# Patient Record
Sex: FEMALE | Race: ASIAN | NOT HISPANIC OR LATINO | URBAN - METROPOLITAN AREA
[De-identification: names, ages, dates, MRNs, and addresses within clinical notes are randomized per-mention and may not be internally consistent; named-entity substitution may affect disease eponyms.]

---

## 2018-03-25 ENCOUNTER — EMERGENCY (EMERGENCY)
Facility: HOSPITAL | Age: 63
LOS: 1 days | Discharge: HOME | End: 2018-03-25
Attending: EMERGENCY MEDICINE | Admitting: EMERGENCY MEDICINE
Payer: COMMERCIAL

## 2018-03-25 VITALS
RESPIRATION RATE: 16 BRPM | HEART RATE: 87 BPM | OXYGEN SATURATION: 100 % | TEMPERATURE: 99 F | DIASTOLIC BLOOD PRESSURE: 82 MMHG | SYSTOLIC BLOOD PRESSURE: 176 MMHG

## 2018-03-25 VITALS
HEART RATE: 72 BPM | TEMPERATURE: 99 F | DIASTOLIC BLOOD PRESSURE: 71 MMHG | RESPIRATION RATE: 18 BRPM | SYSTOLIC BLOOD PRESSURE: 167 MMHG | OXYGEN SATURATION: 100 %

## 2018-03-25 PROCEDURE — 72100 X-RAY EXAM L-S SPINE 2/3 VWS: CPT | Mod: 26

## 2018-03-25 PROCEDURE — 72074 X-RAY EXAM THORAC SPINE4/>VW: CPT | Mod: 26

## 2018-03-25 PROCEDURE — 99283 EMERGENCY DEPT VISIT LOW MDM: CPT

## 2018-03-25 RX ORDER — ACETAMINOPHEN 500 MG
650 TABLET ORAL ONCE
Qty: 0 | Refills: 0 | Status: COMPLETED | OUTPATIENT
Start: 2018-03-25 | End: 2018-03-25

## 2018-03-25 NOTE — ED PROVIDER NOTE - PROGRESS NOTE DETAILS
pt asking to go home. pt to see pmd in 1-2 days. copies of xr read given to pt. pt advised to have BP rechecked in one week.
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
## 2018-09-26 PROBLEM — Z00.00 ENCOUNTER FOR PREVENTIVE HEALTH EXAMINATION: Status: ACTIVE | Noted: 2018-09-26

## 2018-09-26 PROBLEM — I10 ESSENTIAL (PRIMARY) HYPERTENSION: Chronic | Status: ACTIVE | Noted: 2018-03-25

## 2018-10-01 ENCOUNTER — APPOINTMENT (OUTPATIENT)
Dept: GYNECOLOGIC ONCOLOGY | Facility: CLINIC | Age: 63
End: 2018-10-01
Payer: COMMERCIAL

## 2018-10-01 VITALS
SYSTOLIC BLOOD PRESSURE: 148 MMHG | HEART RATE: 77 BPM | BODY MASS INDEX: 24.99 KG/M2 | DIASTOLIC BLOOD PRESSURE: 81 MMHG | HEIGHT: 65 IN | WEIGHT: 150 LBS

## 2018-10-01 PROCEDURE — 99244 OFF/OP CNSLTJ NEW/EST MOD 40: CPT

## 2018-10-01 RX ORDER — METFORMIN HYDROCHLORIDE 625 MG/1
TABLET ORAL
Refills: 0 | Status: ACTIVE | COMMUNITY

## 2018-10-01 RX ORDER — LOSARTAN POTASSIUM 100 MG/1
TABLET, FILM COATED ORAL
Refills: 0 | Status: ACTIVE | COMMUNITY

## 2018-11-29 ENCOUNTER — OUTPATIENT (OUTPATIENT)
Dept: OUTPATIENT SERVICES | Facility: HOSPITAL | Age: 63
LOS: 1 days | End: 2018-11-29
Payer: COMMERCIAL

## 2018-11-29 VITALS
OXYGEN SATURATION: 99 % | WEIGHT: 149.91 LBS | HEART RATE: 6 BPM | RESPIRATION RATE: 12 BRPM | SYSTOLIC BLOOD PRESSURE: 140 MMHG | HEIGHT: 63 IN | TEMPERATURE: 99 F | DIASTOLIC BLOOD PRESSURE: 84 MMHG

## 2018-11-29 DIAGNOSIS — E11.9 TYPE 2 DIABETES MELLITUS WITHOUT COMPLICATIONS: ICD-10-CM

## 2018-11-29 DIAGNOSIS — I10 ESSENTIAL (PRIMARY) HYPERTENSION: ICD-10-CM

## 2018-11-29 DIAGNOSIS — D25.9 LEIOMYOMA OF UTERUS, UNSPECIFIED: ICD-10-CM

## 2018-11-29 LAB
BLD GP AB SCN SERPL QL: NEGATIVE — SIGNIFICANT CHANGE UP
HBA1C BLD-MCNC: 6.6 % — HIGH (ref 4–5.6)
RH IG SCN BLD-IMP: POSITIVE — SIGNIFICANT CHANGE UP

## 2018-11-29 PROCEDURE — 93010 ELECTROCARDIOGRAM REPORT: CPT

## 2018-11-29 RX ORDER — SODIUM CHLORIDE 9 MG/ML
1000 INJECTION, SOLUTION INTRAVENOUS
Qty: 0 | Refills: 0 | Status: DISCONTINUED | OUTPATIENT
Start: 2018-12-11 | End: 2018-12-11

## 2018-11-29 NOTE — H&P PST ADULT - HISTORY OF PRESENT ILLNESS
This is a 62 y.o. female with LMP age 56  with history of leiomyoma , sono last 3 years ago . Pt was evaluated by GYN  , sone done . Pt has leiomyoma of uterus ,unspecified . Pt referred to Dr Correa, now for surgery .

## 2018-11-29 NOTE — H&P PST ADULT - NSANTHOSAYNRD_GEN_A_CORE
No. ARIELLA screening performed.  STOP BANG Legend: 0-2 = LOW Risk; 3-4 = INTERMEDIATE Risk; 5-8 = HIGH Risk

## 2018-11-29 NOTE — H&P PST ADULT - PROBLEM SELECTOR PLAN 3
pt to hold metformin with 24 hours of surgery , stat FS  upon admit ,monitor FS during hospital stay . Pt is npo from 11 pm the night before surgery .

## 2018-11-29 NOTE — H&P PST ADULT - NS PRO LAST MENSTRUAL DATE
LMP age 56 no back pain, no gout, no musculoskeletal pain, no neck pain, and no weakness. no back pain, no musculoskeletal pain, no neck pain, and no weakness.

## 2018-11-29 NOTE — H&P PST ADULT - TEACHING/LEARNING LEARNING PREFERENCES
individual instruction/written material/pictorial/skill demonstration/computer/internet/verbal instruction

## 2018-11-29 NOTE — H&P PST ADULT - LYMPHATIC
posterior cervical L/supraclavicular R/anterior cervical L/anterior cervical R/supraclavicular L/posterior cervical R

## 2018-11-29 NOTE — H&P PST ADULT - RS GEN PE MLT RESP DETAILS PC
clear to auscultation bilaterally/no rhonchi/no rales/good air movement/breath sounds equal/no wheezes/respirations non-labored

## 2018-12-10 ENCOUNTER — TRANSCRIPTION ENCOUNTER (OUTPATIENT)
Age: 63
End: 2018-12-10

## 2018-12-11 ENCOUNTER — RESULT REVIEW (OUTPATIENT)
Age: 63
End: 2018-12-11

## 2018-12-11 ENCOUNTER — OUTPATIENT (OUTPATIENT)
Dept: OUTPATIENT SERVICES | Facility: HOSPITAL | Age: 63
LOS: 1 days | Discharge: HOME | End: 2018-12-11
Payer: COMMERCIAL

## 2018-12-11 ENCOUNTER — APPOINTMENT (OUTPATIENT)
Dept: GYNECOLOGIC ONCOLOGY | Facility: HOSPITAL | Age: 63
End: 2018-12-11

## 2018-12-11 VITALS
DIASTOLIC BLOOD PRESSURE: 77 MMHG | RESPIRATION RATE: 15 BRPM | TEMPERATURE: 99 F | HEART RATE: 75 BPM | SYSTOLIC BLOOD PRESSURE: 150 MMHG | OXYGEN SATURATION: 100 %

## 2018-12-11 VITALS
TEMPERATURE: 98 F | RESPIRATION RATE: 16 BRPM | HEART RATE: 70 BPM | SYSTOLIC BLOOD PRESSURE: 165 MMHG | OXYGEN SATURATION: 100 % | HEIGHT: 65 IN | WEIGHT: 145.06 LBS | DIASTOLIC BLOOD PRESSURE: 77 MMHG

## 2018-12-11 DIAGNOSIS — D25.9 LEIOMYOMA OF UTERUS, UNSPECIFIED: ICD-10-CM

## 2018-12-11 LAB
GLUCOSE BLDC GLUCOMTR-MCNC: 144 MG/DL — HIGH (ref 70–99)
RH IG SCN BLD-IMP: POSITIVE — SIGNIFICANT CHANGE UP

## 2018-12-11 PROCEDURE — S2900 ROBOTIC SURGICAL SYSTEM: CPT | Mod: NC

## 2018-12-11 PROCEDURE — 88307 TISSUE EXAM BY PATHOLOGIST: CPT | Mod: 26

## 2018-12-11 PROCEDURE — 88331 PATH CONSLTJ SURG 1 BLK 1SPC: CPT | Mod: 26

## 2018-12-11 PROCEDURE — 88112 CYTOPATH CELL ENHANCE TECH: CPT | Mod: 26

## 2018-12-11 PROCEDURE — 58572 TLH UTERUS OVER 250 G: CPT | Mod: GC

## 2018-12-11 RX ORDER — SODIUM CHLORIDE 9 MG/ML
1000 INJECTION, SOLUTION INTRAVENOUS
Qty: 0 | Refills: 0 | Status: DISCONTINUED | OUTPATIENT
Start: 2018-12-11 | End: 2018-12-26

## 2018-12-11 RX ORDER — DEXTROSE 50 % IN WATER 50 %
25 SYRINGE (ML) INTRAVENOUS ONCE
Qty: 0 | Refills: 0 | Status: DISCONTINUED | OUTPATIENT
Start: 2018-12-11 | End: 2018-12-26

## 2018-12-11 RX ORDER — INSULIN LISPRO 100/ML
VIAL (ML) SUBCUTANEOUS AT BEDTIME
Qty: 0 | Refills: 0 | Status: DISCONTINUED | OUTPATIENT
Start: 2018-12-11 | End: 2018-12-26

## 2018-12-11 RX ORDER — DEXTROSE 50 % IN WATER 50 %
12.5 SYRINGE (ML) INTRAVENOUS ONCE
Qty: 0 | Refills: 0 | Status: DISCONTINUED | OUTPATIENT
Start: 2018-12-11 | End: 2018-12-26

## 2018-12-11 RX ORDER — HYDROMORPHONE HYDROCHLORIDE 2 MG/ML
0.5 INJECTION INTRAMUSCULAR; INTRAVENOUS; SUBCUTANEOUS
Qty: 0 | Refills: 0 | Status: DISCONTINUED | OUTPATIENT
Start: 2018-12-11 | End: 2018-12-12

## 2018-12-11 RX ORDER — DEXTROSE 50 % IN WATER 50 %
15 SYRINGE (ML) INTRAVENOUS ONCE
Qty: 0 | Refills: 0 | Status: DISCONTINUED | OUTPATIENT
Start: 2018-12-11 | End: 2018-12-26

## 2018-12-11 RX ORDER — FENTANYL CITRATE 50 UG/ML
50 INJECTION INTRAVENOUS
Qty: 0 | Refills: 0 | Status: DISCONTINUED | OUTPATIENT
Start: 2018-12-11 | End: 2018-12-12

## 2018-12-11 RX ORDER — HEPARIN SODIUM 5000 [USP'U]/ML
5000 INJECTION INTRAVENOUS; SUBCUTANEOUS ONCE
Qty: 0 | Refills: 0 | Status: COMPLETED | OUTPATIENT
Start: 2018-12-11 | End: 2018-12-11

## 2018-12-11 RX ORDER — ONDANSETRON 8 MG/1
4 TABLET, FILM COATED ORAL ONCE
Qty: 0 | Refills: 0 | Status: DISCONTINUED | OUTPATIENT
Start: 2018-12-11 | End: 2018-12-12

## 2018-12-11 RX ORDER — LOSARTAN POTASSIUM 100 MG/1
1 TABLET, FILM COATED ORAL
Qty: 0 | Refills: 0 | COMMUNITY

## 2018-12-11 RX ORDER — ACETAMINOPHEN 500 MG
975 TABLET ORAL ONCE
Qty: 0 | Refills: 0 | Status: COMPLETED | OUTPATIENT
Start: 2018-12-11 | End: 2018-12-11

## 2018-12-11 RX ORDER — METFORMIN HYDROCHLORIDE 850 MG/1
1 TABLET ORAL
Qty: 0 | Refills: 0 | COMMUNITY

## 2018-12-11 RX ORDER — INSULIN LISPRO 100/ML
VIAL (ML) SUBCUTANEOUS
Qty: 0 | Refills: 0 | Status: DISCONTINUED | OUTPATIENT
Start: 2018-12-11 | End: 2018-12-26

## 2018-12-11 RX ORDER — GLUCAGON INJECTION, SOLUTION 0.5 MG/.1ML
1 INJECTION, SOLUTION SUBCUTANEOUS ONCE
Qty: 0 | Refills: 0 | Status: DISCONTINUED | OUTPATIENT
Start: 2018-12-11 | End: 2018-12-26

## 2018-12-11 RX ADMIN — HEPARIN SODIUM 5000 UNIT(S): 5000 INJECTION INTRAVENOUS; SUBCUTANEOUS at 06:46

## 2018-12-11 RX ADMIN — Medication 975 MILLIGRAM(S): at 20:50

## 2018-12-11 RX ADMIN — Medication 975 MILLIGRAM(S): at 20:20

## 2018-12-11 RX ADMIN — SODIUM CHLORIDE 30 MILLILITER(S): 9 INJECTION, SOLUTION INTRAVENOUS at 06:47

## 2018-12-11 NOTE — ASU DISCHARGE PLAN (ADULT/PEDIATRIC). - ACTIVITY LEVEL
no tub baths/nothing per vagina/no tampons/no intercourse/no douching nothing per vagina/no intercourse/no heavy lifting/no tampons/no sports/gym/no tub baths/no douching

## 2018-12-11 NOTE — CHART NOTE - NSCHARTNOTEFT_GEN_A_CORE
PA POST OP NOTE:       SUBJECTIVE:  Patient seen and examined at bedside. Patient is awake and resting comfortably. Reports discomfort from arshad and mild nausea at this time. Denies c/o  vomiting, sob, cp or palpitations.    Vital Signs Last 24 Hrs  T(C): 36.9 (11 Dec 2018 13:00), Max: 36.9 (11 Dec 2018 06:17)  T(F): 98.4 (11 Dec 2018 13:00), Max: 98.4 (11 Dec 2018 06:17)  HR: 61 (11 Dec 2018 13:00) (51 - 70)  BP: 137/62 (11 Dec 2018 13:00) (129/61 - 165/77)  BP(mean): 82 (11 Dec 2018 13:00) (74 - 82)  RR: 17 (11 Dec 2018 13:00) (11 - 17)  SpO2: 96% (11 Dec 2018 13:00) (94% - 100%)      PHYSICAL EXAM:    LUNGS: Clear to auscultation bilaterally; No wheezing.  HEART: Regular, rate and rhythm; No murmurs.  ABDOMEN: Soft, decreased BS, nondistended and appropriately tender on palpation.  INCISION:  Scope sites intact with scant serosanguinous drainage on umbilical dressing.  EXTREMITIES: No swelling or calf tenderness bilaterally. Venodynes in place.  ARSHAD: Urine clear. Removed by me in PACU.    MEDICATIONS  (STANDING):  dextrose 5%. 1000 milliLiter(s) (50 mL/Hr) IV Continuous <Continuous>  dextrose 50% Injectable 12.5 Gram(s) IV Push once  dextrose 50% Injectable 25 Gram(s) IV Push once  dextrose 50% Injectable 25 Gram(s) IV Push once  insulin lispro (HumaLOG) corrective regimen sliding scale   SubCutaneous three times a day before meals  insulin lispro (HumaLOG) corrective regimen sliding scale   SubCutaneous at bedtime  lactated ringers. 1000 milliLiter(s) (30 mL/Hr) IV Continuous <Continuous>    MEDICATIONS  (PRN):  dextrose 40% Gel 15 Gram(s) Oral once PRN Blood Glucose LESS THAN 70 milliGRAM(s)/deciliter  fentaNYL    Injectable 50 MICROGram(s) IV Push every 10 minutes PRN Severe Pain (7 - 10)  glucagon  Injectable 1 milliGRAM(s) IntraMuscular once PRN Glucose LESS THAN 70 milligrams/deciliter  HYDROmorphone  Injectable 0.5 milliGRAM(s) IV Push every 10 minutes PRN Moderate Pain (4 - 6)  ondansetron Injectable 4 milliGRAM(s) IV Push once PRN Nausea and/or Vomiting      ASSESMENT/PLAN:  64 y/o POD#0 from Robotic TLH, BSO  in stable condition.    1. Regular diet as tolerate.  2. IVF: RL @125cc/hr.  3. Activity: Out of bed with assist.  4. Vital Signs Q routine.  5.  Pain meds and anti emetic as ordered.  6. DTV  7. Continue  Venodynes for DVT prophylaxis.    Evaluate for discharge home when tolerates diet, voids, ambulates and vss.

## 2018-12-11 NOTE — ASU DISCHARGE PLAN (ADULT/PEDIATRIC). - NURSING INSTRUCTIONS
Nothing in vagina, no intercourse, no douching, no tampons, no tub baths, and no swimming for 2 weeks or until cleared by M.D.   Please report any signs and symptoms of infection including Fever (Temp >101 or >100.4 if GYN procedure), uncontrollable nausea, vomiting, diarrhea, chills & inability to urinate. Shower with soap & water when appropriate, pat dry with clean towel & no ointments, creams, powders or lotions on incisions unless okayed by MD. Please report any puss or increased drainage from incision sites, or if redness develops and spreads around sites. Please practice good hand hygiene especially after using the bathroom. Follow up with all MD appointments and take medication(s) as prescribed   Do not take pain medication on an empty stomach.  Increase fluids and fiber in diet to prevent constipation.   No Tylenol/Acetaminophen products until 430 pm Nothing in vagina, no intercourse, no douching, no tampons, no tub baths, and no swimming for 2 weeks or until cleared by M.D.   Please report any signs and symptoms of infection including Fever (Temp >101 or >100.4 if GYN procedure), uncontrollable nausea, vomiting, diarrhea, chills & inability to urinate. Shower with soap & water when appropriate, pat dry with clean towel & no ointments, creams, powders or lotions on incisions unless okayed by MD. Please report any puss or increased drainage from incision sites, or if redness develops and spreads around sites. Please practice good hand hygiene especially after using the bathroom. Follow up with all MD appointments and take medication(s) as prescribed   Do not take pain medication on an empty stomach.  Increase fluids and fiber in diet to prevent constipation.   No Tylenol/Acetaminophen products until 430 pm  If at any time you notice that no urine has drained for more than one hour, make sure that you are drinking adequate liquids. If this persists despite increases in your liquid intake, call your urologist. Do not allow the catheter to restrict your activity. Moving about and walking are important.

## 2018-12-11 NOTE — ASU PREOP CHECKLIST - COMMENTS
took losartan this am with sip of water and famitodine took losartan this am with sip of water and famotidine

## 2018-12-11 NOTE — ASU DISCHARGE PLAN (ADULT/PEDIATRIC). - ITEMS TO FOLLOWUP WITH YOUR PHYSICIAN'S
FEVER GREATER THAT 101, EXCESSIVE BLEEDING, PAIN UNRELIEVED BY PAIN MEDS. 1. Home with Troy Catheter (unable to void) Leg bag given to patient. Teaching given to patient by RN.  2. Follow up with Dr. Correa in her office on Thursday 12/13.  Call the office tomorrow (12/13) to find out best time to go on Thursday  3. FEVER GREATER THAT 101, EXCESSIVE BLEEDING, PAIN UNRELIEVED BY PAIN MEDS.

## 2018-12-11 NOTE — ASU DISCHARGE PLAN (ADULT/PEDIATRIC). - MEDICATION SUMMARY - MEDICATIONS TO TAKE
I will START or STAY ON the medications listed below when I get home from the hospital:    naproxen 500 mg oral tablet  -- 1 tab(s) by mouth 2 times a day   -- Check with your doctor before becoming pregnant.  May cause drowsiness or dizziness.  Obtain medical advice before taking any non-prescription drugs as some may affect the action of this medication.  Take with food or milk.    -- Indication: For Pain    oxyCODONE-acetaminophen 5 mg-325 mg oral tablet  -- 1 tab(s) by mouth every 6 hours, As Needed -for severe pain MDD:4  -- Caution federal law prohibits the transfer of this drug to any person other  than the person for whom it was prescribed.  May cause drowsiness.  Alcohol may intensify this effect.  Use care when operating dangerous machinery.  This prescription cannot be refilled.  This product contains acetaminophen.  Do not use  with any other product containing acetaminophen to prevent possible liver damage.  Using more of this medication than prescribed may cause serious breathing problems.    -- Indication: For Severe pain    losartan 100 mg oral tablet  -- 1 tab(s) by mouth once a day,am  -- Indication: For Home med    metFORMIN 500 mg oral tablet  -- 1 tab(s) by mouth 2 times a day  -- Indication: For Home med

## 2018-12-11 NOTE — CHART NOTE - NSCHARTNOTEFT_GEN_A_CORE
PA Note  Pt was unable to void an adequate amount since removal of Troy Catheter at 130pm.  Pt did try multiple times and 10cc/40cc respectively she was able to void.   Pt was drinking fluids, her IV at 125cc running, and pt ate without difficulty.  I explained to pt some of the reasons why the bladder is unable to empty after surgery and the need for the Troy catheter to be reinserted and for her to come home with it.    RN gave the pt d/c instructions regarding the leg bag and the way to change between the larger bag and leg bag.  Dr. George called regarding above, Dr. Correa informed as well    Instructed to reinsert Troy.  11pm Troy catheter reinserted sterilely into the bladder by me and 700cc urine drained immediately.    Pt felt much better and understood all directions.    BERNIE Kelsey  #[id number]

## 2018-12-11 NOTE — BRIEF OPERATIVE NOTE - PROCEDURE
<<-----Click on this checkbox to enter Procedure Hysterectomy, robot-assisted, for uterus greater than 250 grams, laparoscopic, with salpingo-oophorectomy  12/11/2018    Active  [facility]

## 2018-12-11 NOTE — ASU DISCHARGE PLAN (ADULT/PEDIATRIC). - SPECIAL INSTRUCTIONS
Home with Troy Catheter for inability to void an adequate amount before discharge. Leg bag teaching given to patient and her family member.

## 2018-12-11 NOTE — BRIEF OPERATIVE NOTE - OPERATION/FINDINGS
10 week size uterus with 8cm posterior calcified fibroid, additional smaller fibroids, ovaries and fallopian tubes grossly unremarkable, upper abdomen unremarkable

## 2018-12-11 NOTE — ASU DISCHARGE PLAN (ADULT/PEDIATRIC). - NOTIFY
Persistent Nausea and Vomiting/GYN Fever>100.4/Pain not relieved by Medications/Bleeding that does not stop/Unable to Urinate GYN Fever>100.4/Excessive Diarrhea/Persistent Nausea and Vomiting/Unable to Urinate/Pain not relieved by Medications/Numbness, tingling/Increased Irritability or Sluggishness/Bleeding that does not stop/Inability to Tolerate Liquids or Foods GYN Fever>100.4/Numbness, tingling/Inability to Tolerate Liquids or Foods/Pain not relieved by Medications/Persistent Nausea and Vomiting/Increased Irritability or Sluggishness/Excessive Diarrhea/Bleeding that does not stop

## 2018-12-12 PROBLEM — E11.9 TYPE 2 DIABETES MELLITUS WITHOUT COMPLICATIONS: Chronic | Status: ACTIVE | Noted: 2018-11-29

## 2018-12-13 ENCOUNTER — APPOINTMENT (OUTPATIENT)
Dept: GYNECOLOGIC ONCOLOGY | Facility: CLINIC | Age: 63
End: 2018-12-13

## 2018-12-13 LAB — NON-GYNECOLOGICAL CYTOLOGY STUDY: SIGNIFICANT CHANGE UP

## 2018-12-20 LAB — SURGICAL PATHOLOGY STUDY: SIGNIFICANT CHANGE UP

## 2018-12-27 ENCOUNTER — APPOINTMENT (OUTPATIENT)
Dept: GYNECOLOGIC ONCOLOGY | Facility: CLINIC | Age: 63
End: 2018-12-27
Payer: COMMERCIAL

## 2018-12-27 VITALS
TEMPERATURE: 98.7 F | BODY MASS INDEX: 23.85 KG/M2 | HEIGHT: 65 IN | HEART RATE: 85 BPM | SYSTOLIC BLOOD PRESSURE: 116 MMHG | WEIGHT: 143.13 LBS | DIASTOLIC BLOOD PRESSURE: 78 MMHG

## 2018-12-27 PROCEDURE — 99024 POSTOP FOLLOW-UP VISIT: CPT

## 2018-12-31 ENCOUNTER — OTHER (OUTPATIENT)
Age: 63
End: 2018-12-31

## 2019-01-14 ENCOUNTER — APPOINTMENT (OUTPATIENT)
Dept: GYNECOLOGIC ONCOLOGY | Facility: CLINIC | Age: 64
End: 2019-01-14
Payer: COMMERCIAL

## 2019-01-14 VITALS
HEART RATE: 83 BPM | BODY MASS INDEX: 23.82 KG/M2 | SYSTOLIC BLOOD PRESSURE: 123 MMHG | HEIGHT: 65 IN | WEIGHT: 143 LBS | TEMPERATURE: 98.3 F | DIASTOLIC BLOOD PRESSURE: 82 MMHG

## 2019-01-14 DIAGNOSIS — D21.9 BENIGN NEOPLASM OF CONNECTIVE AND OTHER SOFT TISSUE, UNSPECIFIED: ICD-10-CM

## 2019-01-14 PROCEDURE — 99024 POSTOP FOLLOW-UP VISIT: CPT

## 2019-06-06 ENCOUNTER — RESULT REVIEW (OUTPATIENT)
Age: 64
End: 2019-06-06

## 2021-02-04 NOTE — H&P PST ADULT - BREASTS
Pt is seen, examined, chart reviewed, d/w np/pa.  Management as outlined above.  Chest Pain: Recent NST 07/2020 with no ischemia. If troponins are negative, can D/C home with outpatient ischemic evaluation in 2 weeks with Dr. Allen.
not examined

## 2021-02-22 NOTE — ASU PREOP CHECKLIST - ADVANCE DIRECTIVE ADDRESSED/READDRESSED
Coordination of Care  1. Have you been to the ER, urgent care clinic since your last visit? Hospitalized since your last visit? Yes [facility]: [facility] skin rash. [facility] 2020 covid 19    2. Have you seen or consulted any other health care providers outside of the [address] [place] [address] since your last visit? Include any pap smears or colon screening. Yes When: pt went to dermatologist can not recall name or date    Does the patient need refills? YES    Learning Assessment Complete?  yes  Depression Screening complete in the past 12 months? yes done

## 2021-04-16 NOTE — ASU PATIENT PROFILE, ADULT - AS SC BRADEN ACTIVITY
2021       Marie Lane MD  [address]  [facility] [address]  Via Fax: [phone]      Patient: Zahra Parson   YOB: 1951   Date of Visit: 2021       Dear Dr. Lane:    I saw your patient, Zahra Parson, for an evaluation. Below are my notes for this visit with her.    If you have questions, please do not hesitate to call me.      Sincerely,        Gary Mcdaniels MD        CC: No Recipients  Gary Mcdaniels MD  2021 11:18 AM  Signed    Patient: Zahra Parson Date: 2021   : 1951 Attending: Gary Mcdaniels MD   69 year old female      PRINCIPLE DIAGNOSIS:      1. IgG lambda smoldering multiple myeloma.    Cancer Staging Summary for Zahra Parson           REASON FOR VISIT:    Zahra Parson has a history of IgG lambda smoldering multiple myeloma which was initially diagnosed in 2013.  With a rise in her monoclonal paraprotein 2016 she went on to receive treatment with Revlimid and dexamethasone starting in 2016.  Treatment was complicated by her having poorly controlled diabetes along with her then having a saddle pulmonary embolism in 2017 and future treatment was discontinued.       Treatment was discontinued until there was a rise in her paraprotein level in late .  She subsequently went on to receive ixazomib given along with dexamethasone total of 1 year.  This was administered between between 2018 and 2019.       She had a favorable response to treatment with an improvement noted in the IgG lambda monoclonal paraprotein level.  And no point in time did she have end organ damage with lytic lesions, hypercalcemia or renal insufficiency.    2021    She returns for follow-up visit.      DETAILED ONCOLOGY HISTORY:    Zahra Parson has had a longstanding history of anemia for much of her life.  A bone marrow examination had been done in 1996, as she had a monoclonal  gammopathy.  There was only a very slight increase in the percentage of plasma cells at that time.  There was no evidence of multiple myeloma.  A single noncaseating granuloma was identified.  Starting in June 2012, she was noted to have mild asymptomatic leukopenia.  She became increasingly more anemic over time with normocytic and normochromic indices.  She was not having any symptoms associated with this.     Laboratory studies done in March 2013 revealed normal iron studies and a normal vitamin B12 and folate level.  There was no evidence of hemolytic anemia.  An immunofixation test revealed an IgG lambda monoclonal paraprotein.  The M-spike at that time was 0.4.  Peripheral blood for flow cytometry analysis showed no evidence of PNH or LGL disease.  A bone marrow biopsy and aspirate revealed variable populations of monoclonal plasma cells ranging 3% to 15%.  On average, she had just under 10% plasma cells noted of a monoclonal nature with IgG lambda clonality.  Chromosomes studies on the bone marrow were normal.  She has not had a full staging evaluation done as of yet.  She likely has an evolution towards a smoldering multiple myeloma condition at this time.     Given this information a bone marrow biopsy and aspirate was done (In mid October 2016).  The bone marrow biopsy and aspirate showed a mildly hypercellular bone marrow with 50% cellularity.  There was normal trilineage hematopoiesis.  Minimal dyserythropoiesis was noted with megakaryocytic atypia.  There was 20% monoclonal plasma cell infiltration consistent with multiple myeloma.  Patchy mild increased reticulin fibrosis was noted.  Adequate storage iron was seen. The bone marrow biopsy flow cytometry showed 20% monoclonal plasma cell infiltration compatible with a plasma cell neoplastic disorder.  The FISH analysis revealed evidence of a translocation (11; 14) with this representing a CCBD1/IgH translocation.  This is associated with a favorable  prognosis.     Additional staging studies were subsequently done.  She had a skeletal bone survey done which showed arthritis but no lytic lesions.  She had MRI imaging done of the calvarium, cervical, thoracic and lumbosacral spine.  No lytic lesions or soft tissue lesions were noted.  She had a PET/CT scan done which showed no areas of increased metabolic activity.  She had a 24-hour urine study which showed no increase in light chain excretion.     On December 5, 2016 she treatment with Revlimid at a dose of 25 mg daily on days 1 through 21 which will be administered every 28 days once medication is available.  With this she takes dexamethasone 20 mg daily on days 1 through 4 and again on days 12 through 15.  Treatment with this regimen will be given every 28 days for 9 cycles.  She would then start a new intense therapy with Revlimid at a dose of 10 mg daily for days 1 through 21 every 28 days for 2 years.  This is for management of her smoldering multiple myeloma.     She began the second cycle of therapy with Revlimid on January 9, 2017.   Revlimid was discontinued following her hospitalization at [facility] with a saddle pulmonary embolism which had extended into the segmental branches of the right and left lung.       She was admitted to the hospital after presenting with a 5 day history of anterior chest pain and shortness of breath.  A CT angiogram on January 27, 2017 revealed a several pulmonary embolism which had extended into segmental branches of the right and left lung with involvement and all lobes of the lung.  There was a nonocclusive chronic-appearing DVT seen in the right popliteal vein along with a right Agarwal cyst.  An echocardiogram revealed minimally increased pulmonary pressures.  Her pulmonary emboli was attributed to her being on Revlimid.  A thrombophilia evaluation and was not felt to be indicated.     It was decided that she should remain off Revlimid as she developed a  life-threatening pulmonary embolism despite being on preventative therapy with aspirin.  Reevaluation studies are to be done and if additional therapy is needed she will receive Velcade along with dexamethasone on a weekly basis.     She was sent for a 24-hour urine immunofixation test and for quantitative light chain analysis.  That was submitted in February 2017 however the [place] laboratory did not do the 24-hour urine quantitative lambda light chain testing which was requested.  Her light chain studies which had been done the office in early February 2017 showed considerable improvement in those values from the serum studies.     It was decided that she would remain off treatment with Revlimid and dexamethasone.       A 24-hour urine study on May 31, 2017 showed no free kappa or lambda light chain within the urine.     She had a follow-up venous Doppler study of the right lower extremity on May 31, 2017 which showed no evidence of a DVT.  A chest x-ray done on May 31, 2017 was completely normal.  A ventilation perfusion lung scan on May 31, 2017 showed no evidence of a pulmonary embolism.     It was decided that she would complete her 6 months of anticoagulation therapy with Xarelto at the end of July 2017.     A 24-hour urine study on from June 2018 showed no free kappa or lambda light chain within the urine.  Follow-up laboratory studies showed a stable monoclonal paraprotein along with other stable laboratory findings suggesting that her multiple myeloma has not been more active, despite being off Revlimid since January 2017.     Follow-up studies after her hospitalization with her pulmonary emboli in January 2017 revealed a stable IgG lambda monoclonal paraprotein over time.  Her renal function and CBC also remained stable as did her 24-hour urine studies.     Laboratory studies from June 2018 revealed a slight increase in her IgG lambda monoclonal paraprotein level from 1.1 to a value of 1.7.  The  beta-2 microglobulin level remains low at 2.2 and her CBC and chemistry studies remained favorable.  A subsequent 24-hour urine light chain analysis from October 2018 and her 24-hour urine immunofixation from June 2018 showed no free light chains in the urine.  It was decided that she would remain off treatment.     Laboratory studies from October 2018 revealed a continued but gradual rise in her monoclonal paraprotein spike.  At that time her IgG lambda monoclonal paraprotein spike was 1.8.  The lambda light chain value had increased gradually over time in the serum.  The ratio of kappa:adrien light chain values had declined over time.  A 24-hour urine test from early October 2018 showed no free light chains in the urine.  A subsequent PET/CT scan from November 8, 2018 revealed no abnormal areas of increased metabolic activity.     Given the steady rise in her IgG lambda monoclonal paraprotein spike and free lambda light chain values it was felt that she still has smoldering multiple myeloma which was becoming slowly more active over time.  She had no evidence of endorgan damage.     I recommended to the patient that she start treatment with ixazomib given along with dexamethasone (12 mg) on days 1, 8 and 15 every 28 days for 12 months.  This treatment was initiated on December 7, 2018.     At the time of her appointment on June 1, 2019 she presented with shingles which had been present for approximately 4 days prior to the appointment.  The third cycle of treatment was subsequently delayed for 2 weeks as a result of that infection.  It was recommended that she take valacyclovir 1000 mg 3 times a day for 7 days after which she would take 500 mg daily as an ongoing medication for prevention.     Since beginning treatment with chemotherapy with ixazomib given along with dexamethasone the IgG lambda paraprotein initially stabilized in addition to the Kappa : Lambda light chain ratio remaining stable.  Over time of  these levels started to improve. This is consistent with her having a benefit from this treatment as of March 2019.     Laboratory studies which were checked in April 2019 show that the monoclonal IgG lambda M spike protein decreased from 1.8 to 1.3.  With ongoing treatment to her IgG lambda paraprotein has continued to look improved.   The M spike was 1.3 in June and 1.4 on July 2019.     With her having dental issues with receding gums and plaque and with her having mild elevations of her blood glucose it was decided that she should reduce the dexamethasone dose to 8 mg and take it only on days 1 and 15 of each cycle as of July 26, 2019.     She received the 12th and last cycle of treatment with chemotherapy with ixazomib given along with dexamethasone starting on October 25, 2019.     Her immunofixation test from March 2020 revealed a stable IgG lambda M spike of 1.75.  The kappa/lambda ratio was normal at 0.33.  This is similar to the results of laboratory studies back in 2018 with regards to the M spike measurement.  The kappa/lambda ratio was however improved.    The immunofixation test in July 2020 revealed an M spike of 1.9.  The kappa/lambda ratio was 0.23.    She has had persistent and ongoing chronic anemia with a hemoglobin level averaging 10.0 since her initial diagnosis with smoldering multiple myeloma.    PAST MEDICAL HISTORY:    Zahra Parson has a history of hypertension and hyperlipidemia.  She has had borderline diabetes controlled with diet.  She has had benign colonic polyps removed in the past.  She had a lipoma removed in the past.  She has benign essential tremors.  She has a history of borderline diabetes.     She had a pacemaker placed March 2018 when she was found to have a cardiac arrhythmia.    Past Medical History:   Diagnosis Date   • Diabetes (CMS/HCC)    • History of pulmonary embolism 01/2017   • Hyperlipidemia    • Hypertension    • Osteoarthritis         Past Surgical History:      Procedure Laterality Date   • Hysterectomy  10/11/2010        History reviewed. No pertinent family history.      has a past surgical history that includes Hysterectomy (10/11/2010).       SUBJECTIVE:     She has in general been doing well since her last visit.  She has stable fatigue.  She plans to start getting out of the house more and walk now that the weather is better and she has had both Covid 19 vaccinations.    Family has been healthy as well.    She has had both of her Covid 19 vaccinations.    No recent infections.    No respiratory, GI or  symptoms.    She has stable arthralgias but no localized severe back pain or bone pain.    All systems reviewed and are negative with the except of the findings noted above.    VITAL SIGNS:    Blood pressure 130/80, pulse 68, temperature 97.1 °F (36.2 °C), resp. rate 16, height 5' 6\" (1.676 m), weight 74.1 kg (163 lb 6.4 oz).    Oncology Encounter Vitals [04/16/21 1034]   ONC OP Encounter Vitals Group      /80      Heart Rate 68      Resp 16      Temp 97.1 °F (36.2 °C)      Temp src       SpO2       Weight 163 lb 6.4 oz (74.1 kg)      Height 5' 6\" (1.676 m)      Pain Score  0      Pain Location       Pain Education?       BSA (Calculated - m2) - [employer] Barbara 1.84      BMI (Calculated) 26.37       Weight    04/16/21 1034   Weight: 74.1 kg (163 lb 6.4 oz)        ECOG [04/16/21 1101]   ECOG Performance Status 0         PHYSICAL EXAMINATION:    General Appearance:  Alert, cooperative, no distress,   HEENT:  Normal conjuctivae, no icterus, no oral lesions or stomatitis,   Neck: Neck Supple, No thyromegally, Normal cervical, supraclavicular or infraclavicular lymphadenopathy  Lungs:  Clear, Normal symmetrical breath sounds, no tachypnea, no rhonchi, wheezing or rales  Heart:  Regular rate and rhythm, S1and S2 normal, no murmur, rub or gallop,   Abdomen:  Soft, non-tender, bowel sounds normal, no masses, no hepatomegally or splenomegally  Extremities:  No  cyanosis or edema, no signs of a DVT, no localized spine tenderness.  Skin: Skin turgor normal, no rash, no petechiae or ecchymoses  Lymph Nodes: Normal axillary, epitrochlear, inguinal and femoral lymph nodes  Neurologic:  CNII-XII intact, normal mentation, normal strength and gait  Psych:  cooperative    LABORATORY RESULTS:    Lab Services on 04/16/2021   Component Date Value Ref Range Status   • WBC 04/16/2021 2.4* 4.2 - 11.0 K/mcL Final   • RBC 04/16/2021 3.60* 4.00 - 5.20 mil/mcL Final   • HGB 04/16/2021 9.6* 12.0 - 15.5 g/dL Final   • HCT 04/16/2021 30.8* 36.0 - 46.5 % Final   • MCV 04/16/2021 85.6  78.0 - 100.0 fl Final   • MCH 04/16/2021 26.7  26.0 - 34.0 pg Final   • MCHC 04/16/2021 31.2* 32.0 - 36.5 g/dL Final   • RDW-CV 04/16/2021 16.9* 11.0 - 15.0 % Final   • RDW-SD 04/16/2021 52.9* 39.0 - 50.0 fL Final   • PLT 04/16/2021 218  140 - 450 K/mcL Final   • Neutrophil, Percent 04/16/2021 47  % Final   • Lymphocytes, Percent 04/16/2021 44  % Final   • Mono, Percent 04/16/2021 5  % Final   • Eosinophils, Percent 04/16/2021 3  % Final   • Basophils, Percent 04/16/2021 1  % Final   • Immature Granulocytes 04/16/2021 0  % Final   • Absolute Neutrophils 04/16/2021 1.1* 1.8 - 7.7 K/mcL Final   • Absolute Lymphocytes 04/16/2021 1.1  1.0 - 4.0 K/mcL Final   • Absolute Monocytes 04/16/2021 0.1* 0.3 - 0.9 K/mcL Final   • Absolute Eosinophils  04/16/2021 0.1  0.0 - 0.5 K/mcL Final   • Absolute Basophils 04/16/2021 0.0  0.0 - 0.3 K/mcL Final   • Absolute Immmature Granulocytes 04/16/2021 0.0  0.0 - 0.2 K/mcL Final        WBC (K/mcL)   Date Value   04/16/2021 2.4 (L)   01/15/2021 3.3 (L)       Absolute Neutrophils (K/mcL)   Date Value   04/16/2021 1.1 (L)   01/15/2021 1.8       HGB (g/dL)   Date Value   04/16/2021 9.6 (L)   01/15/2021 9.9 (L)       PLT (K/mcL)   Date Value   04/16/2021 218   01/15/2021 234       GOT/AST (Units/L)   Date Value   01/15/2021 20     GPT/ALT (Units/L)   Date Value   01/15/2021 28     No results  found for: GGTP  Alkaline Phosphatase (Units/L)   Date Value   01/15/2021 42 (L)     Bilirubin, Total (mg/dL)   Date Value   01/15/2021 0.3       Sodium (mmol/L)   Date Value   01/15/2021 141     Potassium (mmol/L)   Date Value   01/15/2021 4.1     Chloride (mmol/L)   Date Value   01/15/2021 110 (H)     Carbon Dioxide (mmol/L)   Date Value   01/15/2021 24     BUN (mg/dL)   Date Value   01/15/2021 26 (H)     Creatinine (mg/dL)   Date Value   01/15/2021 1.19 (H)       Glucose (mg/dL)   Date Value   01/15/2021 93     1/15/2021    Vitamin B12:  1254   Folate level:   > 24  Fe saturation: 23%    DATE  M SPIKE Kappa / Lambda    2/4/2019 1.8  0.34  4/15/2019 1.3  0.24  5/21/2019 1.5  0.26  7/2/2019 1.3  0.19  7/29/2019 1.4  0.16  10/9/2019 1.4  0.22  12/11/2019 1.7  0.33  3/5/2020 1.8  0.33  7/18/2020 1.9  0.23  10/16/2020 2.0  0.27  1/15/2021 2.0  0.27 beta-2 microglobulin level 4.3        No results found for: PSA    No results found for: C125    No components found for: C199    No results found for: CEA    No results found for:     RADIOLOGY RESULTS:    n/a    MEDICATIONS:    Current Outpatient Medications   Medication Sig Dispense Refill   • furosemide (LASIX) 20 MG tablet furosemide 20 mg tablet     • sacubitril-valsartan (Entresto) 49-51 MG per tablet every 12 hours.     • metFORMIN (GLUCOPHAGE) 500 MG tablet metformin 500 mg tablet     • simvastatin (ZOCOR) 40 MG tablet simvastatin 40 mg tablet     • carvedilol (COREG) 12.5 MG tablet carvedilol 12.5 mg tablet   TAKE 1 TABLET 2 TIMES DAILY     • Calcium Carb-Cholecalciferol (Calcium Carbonate-Vitamin D3) 600-400 MG-UNIT Tab Take 600 mg by mouth.     • aspirin (Aspir-Low) 81 MG EC tablet daily.     • Pyridoxine HCl (vitamin B-6) 100 MG tablet Take 1 tablet by mouth daily.       No current facility-administered medications for this visit.        ALLERGIES:    ALLERGIES:  No Known Allergies     IMPRESSION/PLAN:     1.   Smoldering Myeloma.      In 2016 there was  evidence of an increase in the percentage of monoclonal plasma cells within the bone marrow.  She still had smoldering myeloma at that time.  With the rise in her paraprotein level she began treatment with Revlimid and dexamethasone.  This was poorly tolerated for multiple reasons including uncontrolled diabetes in addition to her then developing a life-threatening pulmonary embolism necessitating discontinuation of treatment with Revlimid and dexamethasone.  Her hemoglobin level remained stable over time.     Her IgG lambda monoclonal paraprotein spike increased over time.  The free lambda light chain level increased as the kappa/lambda ratio decreased.  The beta-2 microglobulin level had also increased over time.  Was on that basis that she began treatment with ixamzomib and dexamethasone which was administered between December 7, 2018 and October 25, 2019.     While undergoing treatment the IgG lambda M spike improved.  The kappa/lambda ratio improved over time.       When taken off treatment with Revlimid and dexamethasone there had been a slight increase in the IgG lambda light chain over time early on with a relatively stable kappa/lambda ratio.  She remains completely asymptomatic.  She has no evidence of hypercalcemia or renal insufficiency.  More recently her monoclonal paraprotein M spike has looked favorable.    There is been no need to repeat a PET/CT scan since November 2018 as the IgG lambda M spike seems to fluctuate around the same values and there does not appear to be any clinical need for treatment.  For the same reason there has been no need to repeat a bone marrow examination.     She is to be monitored off treatment.  She will return to see me in 4 months.    2.  Anemia.    She has anemia as result of her previous treatment for smoldering myeloma.  She also has anemia associated with chronic renal insufficiency.  She is not vitamin B12 or folate deficient.  She never had myelodysplasia noted on  a previous bone marrow although it is possible that she could have developed a mild form of myelodysplastic syndrome over the course of time.  I will have her take vitamin B6 at a dose of 100 mg daily.  No plan doing a bone marrow examination.    She has never had a colonoscopy done.  Especially with her degree of anemia (even in the absence of her having iron deficiency or GI symptoms) I will talk to her primary care physician about her being referred to a gastroenterologist for colonoscopy.      Prognosis:  Excellent      Treatment Intent: Palliation    Recent PHQ 2/9 Score    PHQ 2:  Date Adult PHQ 2 Score Adult PHQ 2 Interpretation   4/16/2021 0 No further screening needed       PHQ 9:       Distress Score   Distress Management Group      Distress Scale (0-10)          Time Spent:    25 Minutes.  More than 50% of the time was spent in face to face discussion with patient.  Moderate complexity.    Gary Mcdaniels MD    Discussed with:  Patient                            (3) walks occasionally

## 2021-08-02 NOTE — ED ADULT TRIAGE NOTE - NS ED NOTE AC HIGH RISK COUNTRIES
Department of Anesthesiology  Preprocedure Note       Name:  Mariposa Workman   Age:  60 y.o.  :  1959                                          MRN:  [id number]         Date:  2021      Surgeon: Breezy Avila):  Margo Paige MD    Procedure: Procedure(s):  CYSTOSCOPY, UROLIFT VERSUS TRANSURETHRAL RESECTION PROSTATE    Medications prior to admission:   Prior to Admission medications    Medication Sig Start Date End Date Taking? Authorizing Provider   omeprazole (PRILOSEC) 20 MG delayed release capsule Take 40 mg by mouth Daily   Yes Historical Provider, MD   nystatin (MYCOSTATIN) 592814 UNIT/GM cream Apply topically 2 times daily Apply topically 2 times daily. Yes Historical Provider, MD   metFORMIN (GLUCOPHAGE) 1000 MG tablet Take 1,000 mg by mouth 2 times daily (with meals)   Yes Historical Provider, MD   tamsulosin (FLOMAX) 0.4 MG capsule Take 0.4 mg by mouth daily   Yes Historical Provider, MD   losartan (COZAAR) 100 MG tablet Take 100 mg by mouth daily. Yes Historical Provider, MD   methocarbamol (ROBAXIN) 500 MG tablet Take 750 mg by mouth 4 times daily as needed     Historical Provider, MD       Current medications:    Current Facility-Administered Medications   Medication Dose Route Frequency Provider Last Rate Last Admin    ceFAZolin (ANCEF) 3000 mg in dextrose 5 % 100 mL IVPB  3,000 mg Intravenous Once Margo Paige MD        lactated ringers infusion   Intravenous Continuous Guanaco Kerns  mL/hr at 21 1316 New Bag at 21 1316    sodium chloride flush 0.9 % injection 5-40 mL  5-40 mL Intravenous 2 times per day Guanaco Kerns MD        sodium chloride flush 0.9 % injection 5-40 mL  5-40 mL Intravenous PRN Guanaco Kerns MD        0.9 % sodium chloride infusion  25 mL Intravenous PRN Guanaco Kerns MD        lidocaine PF 1 % injection 0.3 mL  0.3 mL Intradermal Once PRN Guanaco Kerns MD           Allergies:     Allergies   Allergen Reactions    Ace Inhibitors      Tolerates Losartan.  Bee Venom     Gemfibrozil     Statins        Problem List:  There is no problem list on file for this patient. Past Medical History:        Diagnosis Date    Allergic rhinitis     Anemia     Benign neoplasm of colon     Carpal tunnel syndrome     Cervicalgia     Diabetes mellitus ([address]     Diverticulitis     Diverticulosis     Fibromyalgia     Hearing loss     Hematuria     Hemorrhoids     Hyperlipidemia     Hypertension     Myalgia     Neuropathy     ODILON treated with BiPAP     RBBB     Rectal bleeding     Statin intolerance     Urinary frequency        Past Surgical History:        Procedure Laterality Date    ABDOMEN SURGERY      APPENDECTOMY      COLONOSCOPY      HERNIA REPAIR      NOSE SURGERY      PROSTATE BIOPSY      SHOULDER SURGERY         Social History:    Social History     Tobacco Use    Smoking status: Former Smoker    Smokeless tobacco: Never Used   Substance Use Topics    Alcohol use: Yes     Comment: rarely                                Counseling given: Not Answered      Vital Signs (Current):   Vitals:    07/30/21 0845 08/02/21 1244   BP:  (!) 147/90   Pulse:  89   Resp:  20   Temp:  98.2 °F (36.8 °C)   TempSrc:  Infrared   SpO2:  97%   Weight: 292 lb (132.5 kg)    Height: 5' 7\" (1.702 m)                                               BP Readings from Last 3 Encounters:   08/02/21 (!) 147/90       NPO Status: Time of last liquid consumption: 2100                        Time of last solid consumption: 2100                        Date of last liquid consumption: 08/01/21                        Date of last solid food consumption: 08/01/21    BMI:   Wt Readings from Last 3 Encounters:   07/30/21 292 lb (132.5 kg)     Body mass index is 45.73 kg/m².     CBC:   Lab Results   Component Value Date    WBC 6.5 08/02/2021    RBC 4.83 08/02/2021    HGB 13.4 08/02/2021    HCT 39.8 08/02/2021    MCV 82.4 08/02/2021 RDW 14.5 08/02/2021     08/02/2021       CMP:   Lab Results   Component Value Date     08/02/2021    K 4.2 08/02/2021     08/02/2021    CO2 23 08/02/2021    BUN 12 08/02/2021    CREATININE 0.8 08/02/2021    GFRAA >60 08/02/2021    LABGLOM >60 08/02/2021    GLUCOSE 143 08/02/2021    CALCIUM 9.6 08/02/2021       POC Tests:   Recent Labs     08/02/21  1315   POCGLU 129*       Coags: No results found for: PROTIME, INR, APTT    HCG (If Applicable): No results found for: PREGTESTUR, PREGSERUM, HCG, HCGQUANT     ABGs: No results found for: PHART, PO2ART, LKQ6MDC, UGI6UQR, BEART, W7FGZXMX     Type & Screen (If Applicable):  No results found for: LABABO, LABRH    Drug/Infectious Status (If Applicable):  No results found for: HIV, HEPCAB    COVID-19 Screening (If Applicable): No results found for: COVID19        Anesthesia Evaluation    Airway: Mallampati: III  TM distance: <3 FB   Neck ROM: full  Mouth opening: > = 3 FB Dental:    (+) upper dentures      Pulmonary:   (+) sleep apnea: on CPAP,                            ROS comment: H/o tob   Cardiovascular:    (+) hypertension:, hyperlipidemia                  Neuro/Psych:   (+) neuromuscular disease:,             GI/Hepatic/Renal: Neg GI/Hepatic/Renal ROS            Endo/Other:    (+) DiabetesType II DM, , .                 Abdominal:             Vascular: Other Findings:             Anesthesia Plan      general     ASA 3     (Pt agrees to risks, benefits and alternatives of GETA. Questions answered. Willing to proceed with plan.)  Induction: intravenous. Anesthetic plan and risks discussed with patient.                       Polo Ormond, MD   8/2/2021 No

## 2022-12-04 ENCOUNTER — EMERGENCY (EMERGENCY)
Facility: HOSPITAL | Age: 67
LOS: 1 days | Discharge: HOME | End: 2022-12-04
Attending: STUDENT IN AN ORGANIZED HEALTH CARE EDUCATION/TRAINING PROGRAM | Admitting: STUDENT IN AN ORGANIZED HEALTH CARE EDUCATION/TRAINING PROGRAM

## 2022-12-04 VITALS
RESPIRATION RATE: 18 BRPM | HEART RATE: 69 BPM | DIASTOLIC BLOOD PRESSURE: 79 MMHG | OXYGEN SATURATION: 100 % | SYSTOLIC BLOOD PRESSURE: 139 MMHG

## 2022-12-04 VITALS
SYSTOLIC BLOOD PRESSURE: 162 MMHG | TEMPERATURE: 98 F | OXYGEN SATURATION: 100 % | RESPIRATION RATE: 18 BRPM | DIASTOLIC BLOOD PRESSURE: 82 MMHG | HEART RATE: 80 BPM

## 2022-12-04 LAB
ALBUMIN SERPL ELPH-MCNC: 5.1 G/DL — HIGH (ref 3.3–5)
ALP SERPL-CCNC: 67 U/L — SIGNIFICANT CHANGE UP (ref 40–120)
ALT FLD-CCNC: 18 U/L — SIGNIFICANT CHANGE UP (ref 4–33)
ANION GAP SERPL CALC-SCNC: 12 MMOL/L — SIGNIFICANT CHANGE UP (ref 7–14)
APTT BLD: 27.4 SEC — SIGNIFICANT CHANGE UP (ref 27–36.3)
AST SERPL-CCNC: 19 U/L — SIGNIFICANT CHANGE UP (ref 4–32)
BASOPHILS # BLD AUTO: 0.03 K/UL — SIGNIFICANT CHANGE UP (ref 0–0.2)
BASOPHILS NFR BLD AUTO: 0.4 % — SIGNIFICANT CHANGE UP (ref 0–2)
BILIRUB SERPL-MCNC: 0.4 MG/DL — SIGNIFICANT CHANGE UP (ref 0.2–1.2)
BUN SERPL-MCNC: 31 MG/DL — HIGH (ref 7–23)
CALCIUM SERPL-MCNC: 10.3 MG/DL — SIGNIFICANT CHANGE UP (ref 8.4–10.5)
CHLORIDE SERPL-SCNC: 103 MMOL/L — SIGNIFICANT CHANGE UP (ref 98–107)
CO2 SERPL-SCNC: 23 MMOL/L — SIGNIFICANT CHANGE UP (ref 22–31)
CREAT SERPL-MCNC: 1.07 MG/DL — SIGNIFICANT CHANGE UP (ref 0.5–1.3)
EGFR: 57 ML/MIN/1.73M2 — LOW
EOSINOPHIL # BLD AUTO: 0.1 K/UL — SIGNIFICANT CHANGE UP (ref 0–0.5)
EOSINOPHIL NFR BLD AUTO: 1.4 % — SIGNIFICANT CHANGE UP (ref 0–6)
GLUCOSE SERPL-MCNC: 162 MG/DL — HIGH (ref 70–99)
HCT VFR BLD CALC: 37 % — SIGNIFICANT CHANGE UP (ref 34.5–45)
HGB BLD-MCNC: 11.8 G/DL — SIGNIFICANT CHANGE UP (ref 11.5–15.5)
IANC: 4.32 K/UL — SIGNIFICANT CHANGE UP (ref 1.8–7.4)
IMM GRANULOCYTES NFR BLD AUTO: 0.3 % — SIGNIFICANT CHANGE UP (ref 0–0.9)
INR BLD: 0.94 RATIO — SIGNIFICANT CHANGE UP (ref 0.88–1.16)
LYMPHOCYTES # BLD AUTO: 2.15 K/UL — SIGNIFICANT CHANGE UP (ref 1–3.3)
LYMPHOCYTES # BLD AUTO: 30.1 % — SIGNIFICANT CHANGE UP (ref 13–44)
MCHC RBC-ENTMCNC: 26.1 PG — LOW (ref 27–34)
MCHC RBC-ENTMCNC: 31.9 GM/DL — LOW (ref 32–36)
MCV RBC AUTO: 81.9 FL — SIGNIFICANT CHANGE UP (ref 80–100)
MONOCYTES # BLD AUTO: 0.52 K/UL — SIGNIFICANT CHANGE UP (ref 0–0.9)
MONOCYTES NFR BLD AUTO: 7.3 % — SIGNIFICANT CHANGE UP (ref 2–14)
NEUTROPHILS # BLD AUTO: 4.32 K/UL — SIGNIFICANT CHANGE UP (ref 1.8–7.4)
NEUTROPHILS NFR BLD AUTO: 60.5 % — SIGNIFICANT CHANGE UP (ref 43–77)
NRBC # BLD: 0 /100 WBCS — SIGNIFICANT CHANGE UP (ref 0–0)
NRBC # FLD: 0 K/UL — SIGNIFICANT CHANGE UP (ref 0–0)
PLATELET # BLD AUTO: 251 K/UL — SIGNIFICANT CHANGE UP (ref 150–400)
POTASSIUM SERPL-MCNC: 4.1 MMOL/L — SIGNIFICANT CHANGE UP (ref 3.5–5.3)
POTASSIUM SERPL-SCNC: 4.1 MMOL/L — SIGNIFICANT CHANGE UP (ref 3.5–5.3)
PROT SERPL-MCNC: 8.2 G/DL — SIGNIFICANT CHANGE UP (ref 6–8.3)
PROTHROM AB SERPL-ACNC: 10.9 SEC — SIGNIFICANT CHANGE UP (ref 10.5–13.4)
RBC # BLD: 4.52 M/UL — SIGNIFICANT CHANGE UP (ref 3.8–5.2)
RBC # FLD: 13.2 % — SIGNIFICANT CHANGE UP (ref 10.3–14.5)
SODIUM SERPL-SCNC: 138 MMOL/L — SIGNIFICANT CHANGE UP (ref 135–145)
TROPONIN T, HIGH SENSITIVITY RESULT: 23 NG/L — SIGNIFICANT CHANGE UP
WBC # BLD: 7.14 K/UL — SIGNIFICANT CHANGE UP (ref 3.8–10.5)
WBC # FLD AUTO: 7.14 K/UL — SIGNIFICANT CHANGE UP (ref 3.8–10.5)

## 2022-12-04 PROCEDURE — 0042T: CPT | Mod: MA

## 2022-12-04 PROCEDURE — 70496 CT ANGIOGRAPHY HEAD: CPT | Mod: 26,MA

## 2022-12-04 PROCEDURE — 99285 EMERGENCY DEPT VISIT HI MDM: CPT

## 2022-12-04 PROCEDURE — 70498 CT ANGIOGRAPHY NECK: CPT | Mod: 26,MA

## 2022-12-04 RX ORDER — MECLIZINE HCL 12.5 MG
1 TABLET ORAL
Qty: 30 | Refills: 0
Start: 2022-12-04 | End: 2022-12-13

## 2022-12-04 RX ORDER — ACETAMINOPHEN 500 MG
650 TABLET ORAL ONCE
Refills: 0 | Status: COMPLETED | OUTPATIENT
Start: 2022-12-04 | End: 2022-12-04

## 2022-12-04 RX ORDER — MECLIZINE HCL 12.5 MG
25 TABLET ORAL ONCE
Refills: 0 | Status: DISCONTINUED | OUTPATIENT
Start: 2022-12-04 | End: 2022-12-04

## 2022-12-04 RX ORDER — SODIUM CHLORIDE 9 MG/ML
1000 INJECTION INTRAMUSCULAR; INTRAVENOUS; SUBCUTANEOUS ONCE
Refills: 0 | Status: COMPLETED | OUTPATIENT
Start: 2022-12-04 | End: 2022-12-04

## 2022-12-04 RX ORDER — MECLIZINE HCL 12.5 MG
50 TABLET ORAL ONCE
Refills: 0 | Status: COMPLETED | OUTPATIENT
Start: 2022-12-04 | End: 2022-12-04

## 2022-12-04 RX ADMIN — Medication 650 MILLIGRAM(S): at 11:58

## 2022-12-04 RX ADMIN — Medication 50 MILLIGRAM(S): at 10:53

## 2022-12-04 RX ADMIN — SODIUM CHLORIDE 1000 MILLILITER(S): 9 INJECTION INTRAMUSCULAR; INTRAVENOUS; SUBCUTANEOUS at 10:55

## 2022-12-04 NOTE — ED ADULT TRIAGE NOTE - CHIEF COMPLAINT QUOTE
c/o sudden onset of dizziness and unsteadiness and fall, hx of DM and HTN, PERRLA extremities are strong and equal B/l noted ot have slight  right arm drift. Code Stroke called in triage.

## 2022-12-04 NOTE — ED PROVIDER NOTE - PATIENT PORTAL LINK FT
You can access the FollowMyHealth Patient Portal offered by [facility] by registering at the following website: [email or web address]. By joining [facility]’s FollowMyHealth portal, you will also be able to view your health information using other applications (apps) compatible with our system.

## 2022-12-04 NOTE — ED ADULT NURSE REASSESSMENT NOTE - NS ED NURSE REASSESS COMMENT FT1
Pt belongings labeled and were put in closet by clean utility room. Pt resting in stretcher. Resp even and unlabored. NAD.

## 2022-12-04 NOTE — ED PROVIDER NOTE - CLINICAL SUMMARY MEDICAL DECISION MAKING FREE TEXT BOX
Positional room spinning with recent URI concerning for BPPV, however with no prior history and onset of symptoms this morning upon awaking, neurology recommending code stroke at this time.  Ordered CT, labs, if CT is negative consider will give symptomatic treatment.

## 2022-12-04 NOTE — ED PROVIDER NOTE - PROGRESS NOTE DETAILS
Jean Paul Dwyer, DO PGY-3: No evidence of stroke on CT, no further imaging reccomended by neurology, will dc home on meclizine

## 2022-12-04 NOTE — ED PROVIDER NOTE - ATTENDING CONTRIBUTION TO CARE
65 yo F hx HTN, HLD, DM2, presenting w/ c/o dizziness/room spinning sensation since 7:30 am this morning upon waking up. Pt reports falling 2/2 loss of balance, denies LOC. Denies focal weakness/numbness/tingling, no facial droop or change in speech. Last known normal 10 pm last night when watching tv. Code stroke called for sx within 24 hours On exam, NIHSS 0. Plan for CT, labs, meds, reassess, dispo per neuro recs 65 yo F hx HTN, HLD, DM2, presenting w/ c/o dizziness/room spinning sensation since 7:30 am this morning upon waking up. Pt reports falling 2/2 loss of balance, denies LOC. Denies focal weakness/numbness/tingling, no facial droop or change in speech. Last known normal 10 pm last night when watching tv. Code stroke called for sx within 24 hours On exam, NIHSS 0. Plan for CT, labs, meds, reassess, dispo per neuro recs.

## 2022-12-04 NOTE — ED PROVIDER NOTE - PHYSICAL EXAMINATION
CONSTITUTIONAL: well-appearing, in NAD  SKIN: Warm dry, normal skin turgor  HEAD: NCAT  EYES: EOMI, PERRLA, no scleral icterus, conjunctiva pink  ENT: normal pharynx with no erythema or exudates  NECK: Supple; non tender. Full ROM.  CARD: RRR, no murmurs.  RESP: clear to ausculation b/l. No crackles or wheezing.  abd: nondistended  NEURO: normal motor. normal sensory. CN II-XII intact. Cerebellar testing normal. Normal gait. no arm drift. no facial asymmetry  PSYCH: Cooperative, appropriate.

## 2022-12-04 NOTE — ED ADULT NURSE NOTE - NSICDXFAMILYHX_GEN_ALL_CORE_FT
-- DO NOT REPLY / DO NOT REPLY ALL --  -- Message is from Engagement Center Operations ([facility]) --    Offered Waitlist if Available for the Visit Type? No    Caller is requesting an appointment - at a sooner time than what was available.      Caller wants sooner appointment - offered other approved options    Reason for Visit: Discharge from hospital 9/13/22 need to reschedule  due to transportation on Tuesday < Thursday     Is the patient currently scheduled? No    Preferred time to be seen: open     Caller Information       Type Contact Phone/Fax    09/22/2022 09:16 AM CDT Phone (Incoming) LisaJacque garcia (Self) [phone] (M)          Alternative phone number: [phone]      Can a detailed message be left? Yes    Message Turnaround:     [place]:    Please give this turnaround time to the caller:   \"This message will be sent to [state Provider's name]. The clinical team will fulfill your request as soon as they review your message.\"   FAMILY HISTORY:  No pertinent family history in first degree relatives

## 2022-12-04 NOTE — ED ADULT NURSE NOTE - OBJECTIVE STATEMENT
REID RN: Pt received to CT scan area as code stroke. Pt reports waking up this AM at 7:30am feeling room spinning sensation causing her to fall. Pt denies LOC, head strike, head neck or back pain. Pt has hx of HTN and DM. Pt reports symptoms worse when bending head down and improved with keeping her head still. Pt denies nausea, vomiting, slurred speech, weakness, numbness, or tingling. #18g IV placed to LAC, lab work collected as ordered. MD and neurology at bedside for eval. Report given to primary RN.

## 2022-12-04 NOTE — ED PROVIDER NOTE - OBJECTIVE STATEMENT
66f Past medical history of diabetes, hypertension normal blood glucose in triage, presents the ED as code stroke.  Patient states that upon waking up this morning at 730, 2 hours PTA, patient describes room spinning sensation worse with bending head down and improved with keeping head still.  Patient states she went to sleep at approximately 10 PM the night before without any symptoms.  Per daughter, patient did have URI symptoms in the past 5 days.  Patient denies nausea vomiting, no slurred speech from her or her daughter, patient states that when the dizziness started today she had to guide herself down to the floor.  No head trauma.  Patient denies any weakness, no sensory changes or sensory loss.  Patient states that her symptoms are reproducible when looking down towards the floor right now.  Patient is history of BPPV. 66f Past medical history of diabetes, hypertension normal blood glucose in triage, presents the ED as code stroke.  Patient states that upon waking up this morning at 730, 2 hours PTA, patient describes room spinning sensation worse with bending head down and improved with keeping head still.  Patient states she went to sleep at approximately 10 PM the night before without any symptoms.  Per daughter, patient did have URI symptoms in the past 5 days.  Patient denies nausea vomiting, no slurred speech from her or her daughter, patient states that when the dizziness started today she had to guide herself down to the floor.  No head trauma.  Patient denies any weakness, no sensory changes or sensory loss.  Patient states that her symptoms are reproducible when looking down towards the floor right now.  Patient has no  history of BPPV.

## 2022-12-04 NOTE — ED PROVIDER NOTE - NSFOLLOWUPINSTRUCTIONS_ED_ALL_ED_FT
You were seen in the Emergency Department for dizziness.    Follow up with your primary care provider within 3-5 days.    You are being sent a prescription for meclizine. Please see medication labels for instructions and warnings.    If you have any weakness, vision changes, new symptoms, if you develop fever, chills, nausea, vomiting, new or worsening pain, or if you have any new symptoms return to the Emergency Department.

## 2022-12-04 NOTE — CONSULT NOTE ADULT - ASSESSMENT
66F R-handed PMHx HTN and T2DM presenting w/ room spinning sensation. Woke with symptoms this morning. Fluctuating episodes lasting seconds to minutes at a time with complete relief in between. No stroke/TIA in the past. Vitals stable. On exam, pt non-focal. CBC and CMP WNL. CTH, CTA, and CTP w/o acute pathology. Pt not a candidate for tPA due to being out of therapeutic window. Not a candidate for thrombectomy due to no LVO seen on H/N.     JOSSIEN 2200 12/3/22  NIHSS 0  mRS 0    IMPRESSION: Acute vestibular syndrome likely 2/2 peripheral etiology such as BPPV vs. vestibular neuritis. Less likely central and low concerns for acute ischemic stroke.     RECOMMENDATIONS:  [] No further imaging indicated at this time  [] Trial Meclizine 12.5mg Q6H if sx persists  [] Neurochecks and vitals per unit protocol  [] Rest of care per primary team 66F R-handed PMHx HTN and T2DM presenting w/ room spinning sensation. Woke with symptoms this morning. Fluctuating episodes lasting seconds to minutes at a time with complete relief in between. No stroke/TIA in the past. Vitals stable. On exam, pt non-focal. [place]-hallpike positive upon Left gaze. CBC and CMP WNL. CTH, CTA, and CTP w/o acute pathology. Pt not a candidate for tPA due to being out of therapeutic window. Not a candidate for thrombectomy due to no LVO seen on H/N.     LKN 2200 12/3/22  NIHSS 0  mRS 0    IMPRESSION: Acute vestibular syndrome likely 2/2 peripheral etiology such as BPPV vs. vestibular neuritis. Less likely central and low concerns for acute ischemic stroke.     RECOMMENDATIONS:  [] No further imaging indicated at this time  [] Trial Meclizine 12.5mg Q6H if sx persists  [] Vestibular therapy outpatient as needed  [] Neurochecks and vitals per unit protocol  [] Rest of care per primary team      Case d/w Neurology attending.

## 2022-12-04 NOTE — CONSULT NOTE ADULT - SUBJECTIVE AND OBJECTIVE BOX
Neurology - Consult Note    Spectra: [phone] ([facility]), [phone] ([facility])    HPI:  66F R-handed PMHx HTN and T2DM presenting w/ room spinning sensation. Pt states she was fine when going to sleep last night around 10pm but when she woke up this morning and opened her eyes, she felt "dizziness where room is spinning." She stood up to go to the altar to pray and when she bent down pt states symptom got worse. Denies lightheadedness or feelings of passing out. No prior h/o such sx. Denies AC/AP use. LKN 2200 12/3/22, NIHSS 0, mRS 0.    Review of Systems:   CONSTITUTIONAL: No fevers or chills  EYES AND ENT: No visual changes or no throat pain   NECK: No pain or stiffness  RESPIRATORY: No shortness of breath  CARDIOVASCULAR: No chest pain or palpitations  GASTROINTESTINAL: No nausea or vomiting   GENITOURINARY: No dysuria  NEUROLOGICAL: +As stated in HPI above  SKIN: No itching, burning, rashes, or lesions   All other review of systems is negative unless indicated above.    Allergies: NKDA    PMHx/PSHx/Family Hx: As above, otherwise see below   Osteoarthritis    HTN (hypertension)    Diabetes    Social Hx:  Never smoker; no current use of tobacco, alcohol, or illicit drugs  Lives with ***; occupation ***, baseline functional status is ***    Medications:  MEDICATIONS  (STANDING):    MEDICATIONS  (PRN):    Vitals:  T(C): 36.8 (12-04-22 @ 09:33), Max: 36.8 (12-04-22 @ 09:33)  HR: 80 (12-04-22 @ 09:33) (80 - 80)  BP: 162/82 (12-04-22 @ 09:33) (162/82 - 162/82)  RR: 18 (12-04-22 @ 09:33) (18 - 18)  SpO2: 100% (12-04-22 @ 09:33) (100% - 100%)    Physical Examination: INCOMPLETE  General - non-toxic appearing male/female in no acute distress  Cardiovascular - peripheral pulses palpable, no edema  Respiratory - breathing comfortably with no increased work of breathing    Neurologic Exam:  Mental status - Awake, Alert, Oriented to person, place, and time. Speech fluent, repetition and naming intact. Follows simple and complex commands. Attention/concentration, recent and remote memory (including registration 3/3 and recall 3/3), and fund of knowledge intact    Cranial nerves - PERRLA, VFF, EOMI, face sensation (V1-V3) intact b/l, facial strength intact without asymmetry b/l, hearing intact b/l, palate with symmetric elevation, trapezius OR sternocleidomastiod 5/5 strength b/l, tongue midline on protrusion with full lateral movement    Motor - Normal bulk and tone throughout. No pronator drift.    Strength testing            Deltoid      Biceps      Triceps     Wrist Extension    Wrist Flexion     Interossei         R            5                 5               5                     5                              5                        5                 5  L             5                 5               5                     5                              5                        5                 5              Hip Flexion    Hip Extension    Knee Flexion    Knee Extension    Dorsiflexion    Plantar Flexion  R              5                         5                       5                           5                            5                          5  L              5                         5                        5                           5                            5                          5    Sensation - Light touch/temperature OR pain/vibration intact throughout    DTR's -             Biceps      Triceps     Brachioradialis      Patellar    Ankle    Toes/plantar response  R             2+             2+                  2+                       2+            2+                 Down  L              2+             2+                 2+                        2+           2+                 Down    Coordination - Finger to Nose intact b/l. No tremors appreciated    Gait and station - Normal casual gait. Romberg (-)    Labs:          CAPILLARY BLOOD GLUCOSE      POCT Blood Glucose.: 153 mg/dL (04 Dec 2022 09:31)          CSF:                  Radiology:     Neurology - Consult Note    Spectra: [phone] ([facility]), [phone] ([facility])    HPI:  66F R-handed PMHx HTN and T2DM presenting w/ room spinning sensation. Pt states she was fine when going to sleep last night around 10pm but when she woke up this morning and opened her eyes, she felt "dizziness where room is spinning." She stood up to go to the altar to pray and when she bent down pt states symptom got worse and she "slumped over and fell." Episodes fluctuate and lasts seconds to minutes at a time. No headstrike/trauma. Denies lightheadedness or passing out. No prior h/o such sx. Denies AC/AP use. Of note, pt states she has had a fever starting last week ranging from 99.9 to 101 with symptoms of sinusitis. Code stroke activated in ED. LKN 2200 12/3/22, NIHSS 0, mRS 0.    Review of Systems:   CONSTITUTIONAL: No fevers or chills  EYES AND ENT: No visual changes or no throat pain   NECK: No pain or stiffness  RESPIRATORY: No shortness of breath  CARDIOVASCULAR: No chest pain or palpitations  GASTROINTESTINAL: No nausea or vomiting   GENITOURINARY: No dysuria  NEUROLOGICAL: +As stated in HPI above  SKIN: No itching, burning, rashes, or lesions   All other review of systems is negative unless indicated above.    Allergies: NKDA    PMHx/PSHx/Family Hx: As above, otherwise see below   Osteoarthritis    HTN (hypertension)    Diabetes    Social Hx:  No current use of tobacco, alcohol, or illicit drugs  Lives with  at home. Fully independent at baseline.     Medications:  MEDICATIONS  (STANDING):    MEDICATIONS  (PRN):    Vitals:  T(C): 36.8 (12-04-22 @ 09:33), Max: 36.8 (12-04-22 @ 09:33)  HR: 80 (12-04-22 @ 09:33) (80 - 80)  BP: 162/82 (12-04-22 @ 09:33) (162/82 - 162/82)  RR: 18 (12-04-22 @ 09:33) (18 - 18)  SpO2: 100% (12-04-22 @ 09:33) (100% - 100%)    Physical Examination:  General - non-toxic appearing female in mild distress  Cardiovascular - peripheral pulses palpable, no edema  Respiratory - breathing comfortably with no increased work of breathing    Neurologic Exam:  Mental status - Awake, Alert, Oriented to person, place, and time. Speech fluent, repetition and naming intact. Follows simple and complex commands. Attention/concentrationd intact    Cranial nerves - PERRLA, VFF (head impulse without saccadic correction, no nystagmus, no vertical skew), EOMI, face sensation (V1-V3) intact b/l, facial strength intact without asymmetry b/l, hearing intact b/l, palate with symmetric elevation, trapezius 5/5 strength b/l, tongue midline on protrusion with full lateral movement    Motor - Normal bulk and tone throughout. No pronator drift.    Strength testing            Deltoid      Biceps      Triceps     Wrist Extension    Wrist Flexion     Interossei         R            5                 5           5                 5                       5                        5                 5  L             5                 5           5                 5                       5                        5                 5              Hip Flexion    Hip Extension    Knee Flexion    Knee Extension    Dorsiflexion    Plantar Flexion  R              5                   5                     5                     5                            5                          5  L              5                    5                    5                     5                            5                          5    Sensation - Light touch intact throughout    DTR's -             Biceps      Triceps     Brachioradialis      Patellar    Ankle    Toes/plantar response  R             2+             2+                  2+              2+            2+                 w/d to stimuli  L              2+             2+                 2+               2+           2+                  w/d to stimuli    Coordination - Finger to Nose intact b/l. No tremors appreciated    Gait and station - Normal casual gait. Romberg (-)    Labs:                        11.8   7.14  )-----------( 251      ( 04 Dec 2022 09:15 )             37.0     12-04    138  |  103  |  31<H>  ----------------------------<  162<H>  4.1   |  23  |  1.07    Ca    10.3      04 Dec 2022 09:15    TPro  8.2  /  Alb  5.1<H>  /  TBili  0.4  /  DBili  x   /  AST  19  /  ALT  18  /  AlkPhos  67  12-04    PT/INR - ( 04 Dec 2022 09:15 )   PT: 10.9 sec;   INR: 0.94 ratio       PTT - ( 04 Dec 2022 09:15 )  PTT:27.4 sec    CAPILLARY BLOOD GLUCOSE  POCT Blood Glucose.: 153 mg/dL (04 Dec 2022 09:31)    Radiology:  < from: CT Angio Brain Stroke Protocol  w/ IV Cont (12.04.22 @ 09:58) >  IMPRESSION:    HEAD CT: No acute intracranial hemorrhage or acute territorial infarction.    CT PERFUSION demonstrated: No asymmetric or territorial perfusion   abnormality.    If symptoms persist consider follow up head CT or MRI, MRA  if no   contraindication.    CTA COW:  Patent intracranial circulation without flow limiting stenosis   or large vessel occlusion.    CTA NECK: Patent, ECAs, ICAs, no  hemodynamically significant stenosis at    ICA origins by NASCET criteria.  Bilateral vertebral arteries are patent without flow limiting stenosis.    < end of copied text >     Neurology - Consult Note    Spectra: [phone] ([facility]), [phone] ([facility])    HPI:  66F R-handed PMHx HTN and T2DM presenting w/ room spinning sensation. Pt states she was fine when going to sleep last night around 10pm but when she woke up this morning and opened her eyes, she felt "dizziness where room is spinning." She stood up to go to the altar to pray and when she bent down pt states symptom got worse and she "slumped over and fell." Episodes fluctuate and lasts seconds to minutes at a time. No headstrike/trauma. Denies lightheadedness or passing out. No prior h/o such sx. Denies AC/AP use. Of note, pt states she has had a fever starting last week ranging from 99.9 to 101 with symptoms of sinusitis. Code stroke activated in ED. LKN 2200 12/3/22, NIHSS 0, mRS 0.    Review of Systems:   CONSTITUTIONAL: No fevers or chills  EYES AND ENT: No visual changes or no throat pain   NECK: No pain or stiffness  RESPIRATORY: No shortness of breath  CARDIOVASCULAR: No chest pain or palpitations  GASTROINTESTINAL: No nausea or vomiting   GENITOURINARY: No dysuria  NEUROLOGICAL: +As stated in HPI above  SKIN: No itching, burning, rashes, or lesions   All other review of systems is negative unless indicated above.    Allergies: NKDA    PMHx/PSHx/Family Hx: As above, otherwise see below   Osteoarthritis    HTN (hypertension)    Diabetes    Social Hx:  No current use of tobacco, alcohol, or illicit drugs  Lives with  at home. Fully independent at baseline.     Medications:  MEDICATIONS  (STANDING):    MEDICATIONS  (PRN):    Vitals:  T(C): 36.8 (12-04-22 @ 09:33), Max: 36.8 (12-04-22 @ 09:33)  HR: 80 (12-04-22 @ 09:33) (80 - 80)  BP: 162/82 (12-04-22 @ 09:33) (162/82 - 162/82)  RR: 18 (12-04-22 @ 09:33) (18 - 18)  SpO2: 100% (12-04-22 @ 09:33) (100% - 100%)    Physical Examination:  General - non-toxic appearing female in mild distress  Cardiovascular - peripheral pulses palpable, no edema  Respiratory - breathing comfortably with no increased work of breathing    Neurologic Exam:  Mental status - Awake, Alert, Oriented to person, place, and time. Speech fluent, repetition and naming intact. Follows simple and complex commands. Attention/concentrationd intact    Cranial nerves - PERRLA, VFF (head impulse without saccadic correction, no nystagmus, no vertical skew), EOMI, face sensation (V1-V3) intact b/l, facial strength intact without asymmetry b/l, hearing intact b/l, palate with symmetric elevation, trapezius 5/5 strength b/l, tongue midline on protrusion with full lateral movement    Motor - Normal bulk and tone throughout. No pronator drift. Anita-hallpike positive upon Left gaze.     Strength testing            Deltoid      Biceps      Triceps     Wrist Extension    Wrist Flexion     Interossei         R            5                 5           5                 5                       5                        5                 5  L             5                 5           5                 5                       5                        5                 5              Hip Flexion    Hip Extension    Knee Flexion    Knee Extension    Dorsiflexion    Plantar Flexion  R              5                   5                     5                     5                            5                          5  L              5                    5                    5                     5                            5                          5    Sensation - Light touch intact throughout    DTR's -             Biceps      Triceps     Brachioradialis      Patellar    Ankle    Toes/plantar response  R             2+             2+                  2+              2+            2+                 w/d to stimuli  L              2+             2+                 2+               2+           2+                  w/d to stimuli    Coordination - Finger to Nose intact b/l. No tremors appreciated    Gait and station - Normal casual gait. Romberg (-)    Labs:                        11.8   7.14  )-----------( 251      ( 04 Dec 2022 09:15 )             37.0     12-04    138  |  103  |  31<H>  ----------------------------<  162<H>  4.1   |  23  |  1.07    Ca    10.3      04 Dec 2022 09:15    TPro  8.2  /  Alb  5.1<H>  /  TBili  0.4  /  DBili  x   /  AST  19  /  ALT  18  /  AlkPhos  67  12-04    PT/INR - ( 04 Dec 2022 09:15 )   PT: 10.9 sec;   INR: 0.94 ratio       PTT - ( 04 Dec 2022 09:15 )  PTT:27.4 sec    CAPILLARY BLOOD GLUCOSE  POCT Blood Glucose.: 153 mg/dL (04 Dec 2022 09:31)    Radiology:  < from: CT Angio Brain Stroke Protocol  w/ IV Cont (12.04.22 @ 09:58) >  IMPRESSION:    HEAD CT: No acute intracranial hemorrhage or acute territorial infarction.    CT PERFUSION demonstrated: No asymmetric or territorial perfusion   abnormality.    If symptoms persist consider follow up head CT or MRI, MRA  if no   contraindication.    CTA COW:  Patent intracranial circulation without flow limiting stenosis   or large vessel occlusion.    CTA NECK: Patent, ECAs, ICAs, no  hemodynamically significant stenosis at    ICA origins by NASCET criteria.  Bilateral vertebral arteries are patent without flow limiting stenosis.    < end of copied text >

## 2024-03-19 ENCOUNTER — APPOINTMENT (OUTPATIENT)
Dept: CARDIOLOGY | Facility: CLINIC | Age: 69
End: 2024-03-19
Payer: MEDICARE

## 2024-03-19 ENCOUNTER — NON-APPOINTMENT (OUTPATIENT)
Age: 69
End: 2024-03-19

## 2024-03-19 VITALS
OXYGEN SATURATION: 98 % | SYSTOLIC BLOOD PRESSURE: 149 MMHG | BODY MASS INDEX: 22.13 KG/M2 | HEART RATE: 78 BPM | WEIGHT: 133 LBS | DIASTOLIC BLOOD PRESSURE: 79 MMHG

## 2024-03-19 DIAGNOSIS — I10 ESSENTIAL (PRIMARY) HYPERTENSION: ICD-10-CM

## 2024-03-19 DIAGNOSIS — F41.9 ANXIETY DISORDER, UNSPECIFIED: ICD-10-CM

## 2024-03-19 DIAGNOSIS — E78.00 PURE HYPERCHOLESTEROLEMIA, UNSPECIFIED: ICD-10-CM

## 2024-03-19 DIAGNOSIS — E11.9 TYPE 2 DIABETES MELLITUS W/OUT COMPLICATIONS: ICD-10-CM

## 2024-03-19 PROCEDURE — 93000 ELECTROCARDIOGRAM COMPLETE: CPT

## 2024-03-19 PROCEDURE — 99204 OFFICE O/P NEW MOD 45 MIN: CPT | Mod: 25

## 2024-03-19 RX ORDER — ROSUVASTATIN CALCIUM 10 MG/1
10 TABLET, FILM COATED ORAL
Qty: 90 | Refills: 3 | Status: ACTIVE | COMMUNITY
Start: 2024-03-19

## 2024-03-19 NOTE — DISCUSSION/SUMMARY
[FreeTextEntry1] : Ms Coleman has no history of heart disease and is asymptomatic.  Her exam shows a BMI of 22, repeat blood pressure of 120/75, clear lungs, normal cardiac exam, and normal peripheral pulses.  An EKG is within normal limits.  She has no evidence of heart disease and her risk factors are all well-controlled at present.  I told her I did not think she needed any testing at present and that her current medical regimen and lifestyle was fine.  She will continue on Brando, metformin, and rosuvastatin.  She will follow-up here in a year or as needed. [EKG obtained to assist in diagnosis and management of assessed problem(s)] : EKG obtained to assist in diagnosis and management of assessed problem(s)

## 2024-03-19 NOTE — HISTORY OF PRESENT ILLNESS
[FreeTextEntry1] : 68-year-old female self-referred for cardiac evaluation.  She has no history of heart disease and is physically active going to the gym several times per week with no exertional complaints.  However, she is concerned about her heart because of multiple risk factors.  These include hypertension, hypercholesterolemia, and type 2 diabetes.  She had recent blood work done at her internist office and has good control of both her lipids and sugar.  Her last LDL cholesterol was 60 and her last A1c was 6.7.  She believes her blood pressure is also well-controlled.